# Patient Record
(demographics unavailable — no encounter records)

---

## 2025-04-21 NOTE — ASSESSMENT
[FreeTextEntry1] : Farida is a 13 mo old infant girl referred for evaluation of abnormal thyroid function testing.  Today, Farida is a well-appearing girl who is at the % for height, % for weight, and % for BMI. Previous thyroid testing revealed a TSH that was slightly low with a normal free T4.

## 2025-04-22 NOTE — HISTORY OF PRESENT ILLNESS
[FreeTextEntry2] : Farida is a 13 mo old girl referred from her pediatrician for abnormal thyroid function testing.  Farida was seen at her most recent well child visit, where yearly labs were obtained. Labs done 3/12 revealed: normal CMP, A1c 5.3%, free T4 1.2 ng/dL, TSH 0.62 uIU/mL. Because of the lower TSH she was referred to endocrinology.  Farida is otherwise well. Developmentally, she is babbling and working on steps. She eats a variety of table food and the family thinks she may be teething. She has no constipation, diarrhea, fatigue, sleeping difficulties.  screen (checked in office today) was normal.

## 2025-04-22 NOTE — CONSULT LETTER
[Consult Letter:] : I had the pleasure of evaluating your patient, [unfilled]. [Please see my note below.] : Please see my note below. [Consult Closing:] : Thank you very much for allowing me to participate in the care of this patient.  If you have any questions, please do not hesitate to contact me. [Sincerely,] : Sincerely, [Dear  ___] : Dear  [unfilled], [FreeTextEntry3] : Richard Dias D.O.  for Pediatric Endocrinology Fellowship Residency Clerkship Director for Division  of Pediatric Endocrinology [facility]

## 2025-04-22 NOTE — CONSULT LETTER
[Consult Letter:] : I had the pleasure of evaluating your patient, [unfilled]. [Please see my note below.] : Please see my note below. [Consult Closing:] : Thank you very much for allowing me to participate in the care of this patient.  If you have any questions, please do not hesitate to contact me. [Sincerely,] : Sincerely, [Dear  ___] : Dear  [unfilled], [FreeTextEntry3] : Richard Dias D.O.  for Pediatric Endocrinology Fellowship Residency Clerkship Director for Division  of Pediatric Endocrinology [facility]

## 2025-04-22 NOTE — PAST MEDICAL HISTORY
[At Term] : at term [ Section] : by  section [None] : there were no delivery complications [FreeTextEntry1] : 8 lb 8 oz